# Patient Record
Sex: MALE | Race: BLACK OR AFRICAN AMERICAN | NOT HISPANIC OR LATINO | URBAN - METROPOLITAN AREA
[De-identification: names, ages, dates, MRNs, and addresses within clinical notes are randomized per-mention and may not be internally consistent; named-entity substitution may affect disease eponyms.]

---

## 2017-09-20 ENCOUNTER — EMERGENCY (EMERGENCY)
Facility: HOSPITAL | Age: 42
LOS: 1 days | Discharge: HOME | End: 2017-09-20
Attending: EMERGENCY MEDICINE | Admitting: EMERGENCY MEDICINE
Payer: MEDICAID

## 2017-09-20 VITALS
OXYGEN SATURATION: 97 % | SYSTOLIC BLOOD PRESSURE: 132 MMHG | HEART RATE: 74 BPM | TEMPERATURE: 99 F | DIASTOLIC BLOOD PRESSURE: 87 MMHG | RESPIRATION RATE: 16 BRPM

## 2017-09-20 VITALS
OXYGEN SATURATION: 98 % | DIASTOLIC BLOOD PRESSURE: 81 MMHG | RESPIRATION RATE: 18 BRPM | SYSTOLIC BLOOD PRESSURE: 148 MMHG | HEART RATE: 66 BPM | TEMPERATURE: 99 F

## 2017-09-20 DIAGNOSIS — R07.89 OTHER CHEST PAIN: ICD-10-CM

## 2017-09-20 DIAGNOSIS — J18.9 PNEUMONIA, UNSPECIFIED ORGANISM: ICD-10-CM

## 2017-09-20 DIAGNOSIS — Z79.2 LONG TERM (CURRENT) USE OF ANTIBIOTICS: ICD-10-CM

## 2017-09-20 LAB
ALBUMIN SERPL ELPH-MCNC: 3.6 G/DL — SIGNIFICANT CHANGE UP (ref 3.3–5)
ALP SERPL-CCNC: 71 U/L — SIGNIFICANT CHANGE UP (ref 40–120)
ALT FLD-CCNC: 10 U/L — SIGNIFICANT CHANGE UP (ref 10–45)
ANION GAP SERPL CALC-SCNC: 14 MMOL/L — SIGNIFICANT CHANGE UP (ref 5–17)
AST SERPL-CCNC: 17 U/L — SIGNIFICANT CHANGE UP (ref 10–40)
BASOPHILS NFR BLD AUTO: 0 % — SIGNIFICANT CHANGE UP (ref 0–2)
BILIRUB SERPL-MCNC: 0.9 MG/DL — SIGNIFICANT CHANGE UP (ref 0.2–1.2)
BUN SERPL-MCNC: 14 MG/DL — SIGNIFICANT CHANGE UP (ref 7–23)
CALCIUM SERPL-MCNC: 9.4 MG/DL — SIGNIFICANT CHANGE UP (ref 8.4–10.5)
CHLORIDE SERPL-SCNC: 100 MMOL/L — SIGNIFICANT CHANGE UP (ref 96–108)
CO2 SERPL-SCNC: 23 MMOL/L — SIGNIFICANT CHANGE UP (ref 22–31)
CREAT SERPL-MCNC: 1.16 MG/DL — SIGNIFICANT CHANGE UP (ref 0.5–1.3)
EOSINOPHIL NFR BLD AUTO: 0 % — SIGNIFICANT CHANGE UP (ref 0–6)
GLUCOSE SERPL-MCNC: 115 MG/DL — HIGH (ref 70–99)
HCT VFR BLD CALC: 35.6 % — LOW (ref 39–50)
HGB BLD-MCNC: 12.6 G/DL — LOW (ref 13–17)
LYMPHOCYTES # BLD AUTO: 3.5 % — LOW (ref 13–44)
MCHC RBC-ENTMCNC: 29.4 PG — SIGNIFICANT CHANGE UP (ref 27–34)
MCHC RBC-ENTMCNC: 35.4 G/DL — SIGNIFICANT CHANGE UP (ref 32–36)
MCV RBC AUTO: 83.2 FL — SIGNIFICANT CHANGE UP (ref 80–100)
MONOCYTES NFR BLD AUTO: 5.1 % — SIGNIFICANT CHANGE UP (ref 2–14)
NEUTROPHILS NFR BLD AUTO: 91.4 % — HIGH (ref 43–77)
PLATELET # BLD AUTO: 173 K/UL — SIGNIFICANT CHANGE UP (ref 150–400)
POTASSIUM SERPL-MCNC: 3.8 MMOL/L — SIGNIFICANT CHANGE UP (ref 3.5–5.3)
POTASSIUM SERPL-SCNC: 3.8 MMOL/L — SIGNIFICANT CHANGE UP (ref 3.5–5.3)
PROT SERPL-MCNC: 7.3 G/DL — SIGNIFICANT CHANGE UP (ref 6–8.3)
RBC # BLD: 4.28 M/UL — SIGNIFICANT CHANGE UP (ref 4.2–5.8)
RBC # FLD: 13.6 % — SIGNIFICANT CHANGE UP (ref 10.3–16.9)
SODIUM SERPL-SCNC: 137 MMOL/L — SIGNIFICANT CHANGE UP (ref 135–145)
WBC # BLD: 24.3 K/UL — HIGH (ref 3.8–10.5)
WBC # FLD AUTO: 24.3 K/UL — HIGH (ref 3.8–10.5)

## 2017-09-20 PROCEDURE — 99284 EMERGENCY DEPT VISIT MOD MDM: CPT | Mod: 25

## 2017-09-20 PROCEDURE — 71020: CPT | Mod: 26

## 2017-09-20 PROCEDURE — 85025 COMPLETE CBC W/AUTO DIFF WBC: CPT

## 2017-09-20 PROCEDURE — 96374 THER/PROPH/DIAG INJ IV PUSH: CPT

## 2017-09-20 PROCEDURE — 93010 ELECTROCARDIOGRAM REPORT: CPT

## 2017-09-20 PROCEDURE — 80053 COMPREHEN METABOLIC PANEL: CPT

## 2017-09-20 PROCEDURE — 93005 ELECTROCARDIOGRAM TRACING: CPT

## 2017-09-20 PROCEDURE — 71046 X-RAY EXAM CHEST 2 VIEWS: CPT

## 2017-09-20 RX ORDER — KETOROLAC TROMETHAMINE 30 MG/ML
30 SYRINGE (ML) INJECTION ONCE
Qty: 0 | Refills: 0 | Status: DISCONTINUED | OUTPATIENT
Start: 2017-09-20 | End: 2017-09-20

## 2017-09-20 RX ORDER — SODIUM CHLORIDE 9 MG/ML
1000 INJECTION INTRAMUSCULAR; INTRAVENOUS; SUBCUTANEOUS ONCE
Qty: 0 | Refills: 0 | Status: COMPLETED | OUTPATIENT
Start: 2017-09-20 | End: 2017-09-20

## 2017-09-20 RX ADMIN — Medication 30 MILLIGRAM(S): at 12:58

## 2017-09-20 RX ADMIN — SODIUM CHLORIDE 1000 MILLILITER(S): 9 INJECTION INTRAMUSCULAR; INTRAVENOUS; SUBCUTANEOUS at 12:05

## 2017-09-20 RX ADMIN — Medication 30 MILLIGRAM(S): at 12:15

## 2017-09-20 NOTE — ED PROVIDER NOTE - MEDICAL DECISION MAKING DETAILS
43 y/o m cough/fever; xray shows RML pna, noted elevated wbc, pt feeling well, otherwise healthy.  given 1st dose of levaquin, will d/c to continue outpatient course of abx, to return to ED if developing worsening sx, shortness of breath

## 2017-09-20 NOTE — ED PROVIDER NOTE - ATTENDING CONTRIBUTION TO CARE
pt seen and examined by me, agree with above.  43 yo male sent by pmd for low grade fever, cough, blood tinged.  here pt well appearing, lungs rhonchi on right, heart normal.   cxr shows rml inf.  wbc elevated 24.  will start on levaquin.  no indication for admission at this time, normal pulse ox and no comorbidities.  will fu pmd

## 2017-09-20 NOTE — ED ADULT NURSE NOTE - OBJECTIVE STATEMENT
Pt c/o bilat pleuritic pain worse when taking a deep breath for 2 days with hemoptysis. Pt had a fever this morning and was given tylenol, told to follow up here to r/o PE. Negative PPD in August. Mask placed on patient. Pt reports smoking 0.5 packs cigarettes/day. No other complaints.

## 2017-09-20 NOTE — ED PROVIDER NOTE - ENMT, MLM
Airway patent, Nasal mucosa clear. Mouth with normal mucosa. no oropharyngeal exudates and uvula is midline.

## 2017-09-20 NOTE — ED PROVIDER NOTE - OBJECTIVE STATEMENT
43 y/o m no pmh presents c/o cough for the past 2 days, with b/l chest discomfort, pain with breathing.  Pt stating cough is productive of greenish sputum, tinged with blood today.  Pt stating had temp 100.3 at his pmd today, was sent to ED for evaluation. 41 y/o m no pmh presents c/o cough for the past 2 days, with b/l chest discomfort, pain with breathing.  Pt stating cough is productive of greenish sputum, tinged with blood today.  Pt stating had temp 100.3 at his pmd today, was sent to ED for evaluation.  Pt has neg ppd from 3 months ago.  Pt denies SOB, recent travel, sick contacts, n/v/d, all other ROS negative.

## 2022-11-30 NOTE — ED PROVIDER NOTE - EYES, MLM
[facility]  UROLOGY  ________________________________________________    POST-OPERATIVE INSTRUCTIONS     INSTRUCTIONS:  It is normal to have some discomfort when urinating for the first 24-48 hours, as well as increased urinary urgency and frequency. The urine may be blood-tinged for 24-48 hours following the procedure. If you develop large clots in the urine and suspect that the catheter is not draining or if bleeding becomes excessive and viscous, call the office for further instructions. Drink plenty of fluids for 2-3 days following the procedure to keep urine clear. Water is best. Avoid acidic juices. Avoid blood thinners if having active bleeding.    If you have difficulty urinating, try a warm tub bath or shower and try to urinate while in the tub or shower. If you have a catheter please do not bath in a tub - shower instead. If this does not help, please call the office for further instructions or evaluation. If you have difficulty urinating, try a warm tub bath or shower and try to urinate while in the tub or shower. If this does not help, please call the office for further instructions or evaluation.    DIET:   You may resume a normal diet as soon as you feel ready after your procedure. It is common for 24 hours after anesthesia to avoid heavy meals and start slowly with clear liquids. Eat plenty of fiber-rich foods for the first 1-2 weeks. Alcoholic beverages are best avoided in the first 24 hours and while taking any narcotic pain medications. Spicy foods, caffeine, and citrus juices may cause bladder irritation in the several days after surgery. It is important to drink plenty of water (6-8 glasses daily) after your prostate surgery.     ACTIVITY:   It is important to avoid strenuous activity and/or exercise for the first 4 weeks after your surgery. This includes avoiding golfing, tennis, and jogging, working out/stretching activities, lawn work such as mowing, raking, shoveling, sexual  activity, and any lifting more than 15 lbs. Discuss any specific work-related concerns you have with your urologist.     MEDICATIONS:   Your urologist will likely prescribe a narcotic pain medication-take this as needed per the instructions. Note that these medications can cause upset stomach and be constipating. Not everyone needs this narcotic pain medication and you may try extra-strength acetaminophen (Tylenol) over the counter first (Beware of taking any other medications that contain acetaminophen so as not to take too much). You may also be prescribed additional medications that soothe the urinary tract and/or minimize spasms of the bladder. A preventative antibiotic may be given for a 24-hour period after your surgery.     It is important that if you take a daily blood-thinning/anti-platelet medication (Coumadin/warfarin, aspirin, Advil/Motrin, Lovenox, Pradaxa, Eliquis, Xeralto, Plavix, etc) that you only restart this as instructed by your physicians.     If you were taking medications for BPH prior to surgery, continue these until your postoperative appointment.     BOWELS:   It is important to avoid constipation as you recover from your prostate surgery. Straining with bowel movements can increase the risk of bleeding from the urinary tract. Drink plenty of fluids (especially water). Narcotic pain medication can be constipating and it is important to take an over-the-counter stool softener/laxative to help prevent constipation (Miralax, milk of magnesia, Colace, etc).    Anesthesia Post OP  No Puede conducer por 24 horas despues de la anestesia   No driving for 24 hours after anesthesia   No Puede conducir cuando tome medecina receta para el dolor   No driving when taking prescription pain medications   No puede consumir alcohol ni fumar por 24 horas  No alcohol or smoking for 24 hours   Camine con ayuda  Ambulate with assistance  Alguien debe quedarse con usted karon las primeras 24 horas despues de la  anestesia  Someone should stay with you for first 24 hours after anesthesia      Clear bilaterally, pupils equal, round and reactive to light.